# Patient Record
Sex: MALE | Race: WHITE
[De-identification: names, ages, dates, MRNs, and addresses within clinical notes are randomized per-mention and may not be internally consistent; named-entity substitution may affect disease eponyms.]

---

## 2018-03-09 ENCOUNTER — HOSPITAL ENCOUNTER (EMERGENCY)
Dept: HOSPITAL 43 - DL.ED | Age: 48
Discharge: HOME | End: 2018-03-09
Payer: COMMERCIAL
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
## 2018-03-09 DIAGNOSIS — S22.049A: Primary | ICD-10-CM

## 2018-03-09 PROCEDURE — 72125 CT NECK SPINE W/O DYE: CPT

## 2018-03-09 PROCEDURE — 71250 CT THORAX DX C-: CPT

## 2018-03-09 PROCEDURE — 72131 CT LUMBAR SPINE W/O DYE: CPT

## 2018-03-09 PROCEDURE — 72128 CT CHEST SPINE W/O DYE: CPT

## 2018-03-09 PROCEDURE — 99284 EMERGENCY DEPT VISIT MOD MDM: CPT

## 2018-03-09 NOTE — EDM.PDOC
Scribed by Anni Clemente 03/09/18 9425 for Mann Hoffman MD





<Mann Hoffman - Last Filed: 03/09/18 18:53>





ED HPI GENERAL MEDICAL PROBLEM





- General


Chief Complaint: Back Pain or Injury


Stated Complaint: FELL ON ICE 3/9 [phone]


Time Seen by Provider: 03/09/18 18:15


Source of Information: Reports: Patient, RN, RN Notes Reviewed


History Limitations: Reports: No Limitations


  ** Upper Back


Pain Score (Numeric/FACES): 10





- Related Data


 Allergies











Allergy/AdvReac Type Severity Reaction Status Date / Time


 


No Known Allergies Allergy   Verified 03/09/18 18:08











Home Meds: 


 Home Meds





. [No Known Home Meds]  03/09/18 [History]











Past Medical History





- Past Health History


Medical/Surgical History: Denies Medical/Surgical History





Social & Family History





- Tobacco Use


Smoking Status *Q: Never Smoker





- Recreational Drug Use


Recreational Drug Use: No





Course





- Vital Signs


Last Recorded V/S: 


 Last Vital Signs











Temp  98.4 F   03/09/18 18:09


 


Pulse  70   03/09/18 18:09


 


Resp  16   03/09/18 18:09


 


BP  123/98 H  03/09/18 18:09


 


Pulse Ox  98   03/09/18 18:09














- Orders/Labs/Meds


Meds: 


Medications














Discontinued Medications














Generic Name Dose Route Start Last Admin





  Trade Name July  PRN Reason Stop Dose Admin


 


Hydrocodone Bitart/Acetaminophen  1 tab  03/09/18 18:26  03/09/18 18:41





  Norco 325-10 Mg  PO  03/09/18 18:27  1 tab





  ONETIME ONE   Administration


 


Cyclobenzaprine HCl  10 mg  03/09/18 20:15  03/09/18 20:24





  Flexeril  PO  03/09/18 20:16  10 mg





  ONETIME ONE   Administration


 


Cyclobenzaprine HCl  Confirm  03/09/18 21:38  03/09/18 22:12





  Flexeril  Administered  03/09/18 21:39  Not Given





  Dose   





  20 mg   





  .ROUTE   





  .STK-MED ONE   


 


Ketorolac Tromethamine  60 mg  03/09/18 18:26  03/09/18 18:41





  Toradol  IM  03/09/18 18:27  60 mg





  ONETIME ONE   Administration


 


Oxycodone/Acetaminophen  1 tab  03/09/18 20:15  03/09/18 20:24





  Percocet 325-5 Mg  PO  03/09/18 20:16  1 tab





  ONETIME ONE   Administration


 


Oxycodone/Acetaminophen  Confirm  03/09/18 21:38  03/09/18 22:12





  Percocet 325-5 Mg  Administered  03/09/18 21:39  Not Given





  Dose   





  3 tab   





  .ROUTE   





  .STK-MED ONE   














Departure





- Departure


Disposition: Home, Self-Care 01


Clinical Impression: 


 Compression fracture of body of thoracic vertebra, Fall (on) (from) other 

stairs and steps, initial encounter








- Discharge Information


Instructions:  Vertebral Fracture


Referrals: 


PCP,None [Primary Care Provider] - 


Forms:  ED Department Discharge


Additional Instructions: 


no lifting greater than 15#


rest, light activity


avoid any additional trauma


follow up in clinic 2 weeks, sooner if increase in pain, numbess or change in 

symptoms


Percocet 5/325 one every 6 hours as needed for Pain #20


Flexeril 10mg one-j half to one every 8 hours as needed for muscle spasm #15





<Mayda Elaine - Last Filed: 03/10/18 06:29>





ED HPI GENERAL MEDICAL PROBLEM





- History of Present Illness


INITIAL COMMENTS - FREE TEXT/NARRATIVE: 





ED with complaint of back and chest pain, Notes falling on stairs around 8 am 

today while carrying case of water. Landed on back with water landing on chest, 

mid neck and back pain. Chehest pain with taking big breaths and movement.





ED ROS GENERAL





- Review of Systems


Review Of Systems: See Below


Constitutional: Reports: No Symptoms


HEENT: Reports: No Symptoms


Respiratory: Reports: No Symptoms


Cardiovascular: Reports: No Symptoms


GI/Abdominal: Reports: No Symptoms


Musculoskeletal: Reports: Neck Pain, Back Pain


Skin: Reports: No Symptoms


Neurological: Reports: No Symptoms





ED EXAM, UPPER BACK/NECK PAIN





- Physical Exam


Exam: See Below


Exam Limited By: No Limitations


General Appearance: Alert, Mild Distress


Eye Exam: Bilateral Eye: EOMI, PERRL


Ears Exam: Normal External Exam


Nose Exam: Normal Inspection


Throat/Mouth Exam: Normal Inspection, Normal Voice


Head Exam: Atraumatic, Normocephalic


Neck Exam: Abnormal Alignment, Paraspinous Muscle Tender, Spinous Processes 

Tender, Tender Lateral, Tender Midline


Nexus Criteria: Posterior, Midline Cervical Tenderness, Painful Distraction 

Injuries.  No: Evidence of Intoxication, Altered Level of Consciousness, Focal 

Neurological Deficit


Cardiovascular/Respiratory: Regular Rate, Rhythm, Normal Peripheral Pulses, 

Normal Breath Sounds


GI/Abdominal: Normal Bowel Sounds


Extremities: Normal Range of Motion


Neurologic: CNs II-XII nml As Tested, No Motor/Sensory Deficits, Alert, Normal 

Mood/Affect, Oriented x 3.  No: Motor Weakness


Psychiatric: Normal Affect


Skin Exam: Normal Color, Warm/Dry





Course





- Radiology Interpretation


Free Text/Narrative:: 





Cervical CT negative


Thoracic: T4 compression fracture with 10% loss in vertebral body height, 


Lumbar: negative


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 No sign of rib 

fracture. non specific enlarged 2.5cm right paresophageal lymph node. 





- Re-Assessments/Exams


Free Text/Narrative Re-Assessment/Exam: 





03/10/18 06:27


Discussed findings with patient including need of follow up with PCP regarding 

incidental finding of enlarge paraesophageal node.


TC consult Dr. Ruffin regarding T4 fracture. Follow up recommended in 2 weeks. 

No lifting greater than 15 #. 





Departure





- Departure


Time of Disposition: 22:10


Condition: Fair





I have read and agree with the documentation that has been completed regarding 

this visit. By signing this record, I attest that the documentation was 

completed in my physical presence and is an accurate record of the encounter.

## 2021-08-19 NOTE — CR
PROCEDURE INFORMATION: 

Exam: XR Lumbosacral Spine 

Exam date and time: 8/19/2021 10:32 PM 

Age: 51 years old 

Clinical indication: Low back pain 



TECHNIQUE: 

Imaging protocol: XR of the lumbosacral spine. 

Views: 2 or 3 views. 



COMPARISON: 

CT Lumbar Spine wo Cont 3/9/2018 8:20 PM 



FINDINGS: 

Bones/joints: Moderate loss of disc height seen at the L5-S1 level compatible 

with degenerative disc disease. 

Soft tissues: Unremarkable. 



IMPRESSION: 

1. There are no acute osseous findings. 

2. Moderate loss of disc height at L5-S1 compatible with degenerative disc 

disease.

## 2021-08-19 NOTE — EDM.PDOC
ED HPI GENERAL MEDICAL PROBLEM





- General


Chief Complaint: Back Pain or Injury


Stated Complaint: INJURED BACK,


Time Seen by Provider: 08/19/21 22:03


Source of Information: Reports: Patient


History Limitations: Reports: No Limitations





- History of Present Illness


INITIAL COMMENTS - FREE TEXT/NARRATIVE: 





Patient is a unfortunate 51-year-old  male who presents emergency dep

artment today with complaint of low back pain.  The patient reports that he was 

working out and lifting weights 5 days ago and felt a pull in his low back.  The

patient reports that he has had pain ever since however, he was working today 

and felt increasing low pain.  This concerned the patient so he sought care in 

the emergency department today for further evaluation.  The patient reports that

for the past 12 years he has had issues with his back and has had an MRI as 

recently as 2 weeks ago which showed effacement and degenerative disc disease in

L4-L5 and S1.  The patient denies any saddle anesthesia and has had no loss of 

bowel or bladder function no fever no chills.  Patient reports the pain is worse

with range of motion or palpation it does improve with rest but does not 

entirely alleviate


  ** Lower Back


Pain Score (Numeric/FACES): 10





- Related Data


                                    Allergies











Allergy/AdvReac Type Severity Reaction Status Date / Time


 


No Known Allergies Allergy   Verified 03/09/18 18:08











Home Meds: 


                                    Home Meds





Cyclobenzaprine [Flexeril] 10 mg PO TID PRN #15 tab 08/19/21 [Rx]


Ibuprofen 800 mg PO TID #15 tablet 08/19/21 [Rx]


Lidocaine [Blue-Emu Lidocaine Patch] 1 each TP DAILY PRN #10 adh..patch 08/19/21

[Rx]











Past Medical History





- Past Health History


Medical/Surgical History: Denies Medical/Surgical History





ED ROS GENERAL





- Review of Systems


Review Of Systems: See Below


Constitutional: Denies: Fever, Chills


Musculoskeletal: Reports: Back Pain





ED EXAM,LOWER BACK PAIN/INJURY





- Physical Exam


Exam: See Below


Exam Limited By: No Limitations


General Appearance: Alert, WD/WN, Mild Distress


Neck: Normal Inspection, Supple, Non-Tender, Full Range of Motion


Respiratory/Chest: No Respiratory Distress, Lungs Clear, Normal Breath Sounds, 

No Accessory Muscle Use, Chest Non-Tender


Cardiovascular: Normal Peripheral Pulses, Regular Rate, Rhythm, No Edema, No 

Gallop, No JVD, No Murmur, No Rub


GI/Abdominal: Normal Bowel Sounds, Soft, Non-Tender, No Organomegaly, No 

Distention, No Abnormal Bruit, No Mass


Back Exam: Normal Inspection, Other (Bilateral paraspinous muscle tenderness at 

the L4-L5 level that radiates to the bilateral SI joints and bilateral buttocks,

 negative spasm, normal DTRs, positive leg lift bilaterally 30 degrees)


Extremities: Normal Inspection


Neurological: Alert, Normal Mood/Affect, Normal Dorsiflexion, CN II-XII Intact, 

Normal Plantar Flexion, Normal Gait, Normal Reflexes, No Motor/Sensory Deficits,

 Oriented x 3


DTR - Lower Extremities: 4+: Knee (R), Knee (L), Ankle (R), Ankle (L)


Skin Exam: Warm, Dry, Intact





Course





- Vital Signs


Text/Narrative:: 





Work-up today is reassuring, patient has had some improvement in pain but no 

resolution of pain, the patient will be discharged home and encouraged to 

follow-up outpatient with PCP or to return to the emergency department for any 

worsening condition


Last Recorded V/S: 


                                Last Vital Signs











Temp  98.3 F   08/19/21 21:59


 


Pulse  79   08/19/21 21:59


 


Resp  20   08/19/21 21:59


 


BP  143/79 H  08/19/21 21:59


 


Pulse Ox  97   08/19/21 21:59














- Orders/Labs/Meds


Orders: 


                               Active Orders 24 hr











 Category Date Time Status


 


 Sodium Chloride 0.9% [Saline Flush] Med  08/19/21 22:01 Active





 10 ml FLUSH ASDIRECTED PRN   


 


 Saline Lock Insert [OM.PC] Stat Oth  08/19/21 22:01 Ordered








                                Medication Orders





Sodium Chloride (Sodium Chloride 0.9% 10 Ml Syringe)  10 ml FLUSH ASDIRECTED PRN


   PRN Reason: Keep Vein Open








Meds: 


Medications











Generic Name Dose Route Start Last Admin





  Trade Name Freq  PRN Reason Stop Dose Admin


 


Sodium Chloride  10 ml  08/19/21 22:01 





  Sodium Chloride 0.9% 10 Ml Syringe  FLUSH  





  ASDIRECTED PRN  





  Keep Vein Open  














Discontinued Medications














Generic Name Dose Route Start Last Admin





  Trade Name Freq  PRN Reason Stop Dose Admin


 


Haloperidol Lactate  5 mg  08/19/21 22:01  08/19/21 22:16





  Haloperidol Lactate 5 Mg/Ml Sdv  IVPUSH  08/19/21 22:02  5 mg





  ONETIME ONE   Administration














Departure





- Departure


Time of Disposition: 23:09


Disposition: DC[facility]


Condition: Good


Clinical Impression: 


 Lumbar radiculopathy





Lumbar strain


Qualifiers:


 Encounter type: initial encounter Qualified Code(s): S39.012A - Strain of 

muscle, fascia and tendon of lower back, initial encounter








- Discharge Information


*PRESCRIPTION DRUG MONITORING PROGRAM REVIEWED*: No


*COPY OF PRESCRIPTION DRUG MONITORING REPORT IN PATIENT SANDY: No


Prescriptions: 


Lidocaine [Blue-Emu Lidocaine Patch] 1 each TP DAILY PRN #10 adh..patch


 PRN Reason: Pain


Cyclobenzaprine [Flexeril] 10 mg PO TID PRN #15 tab


 PRN Reason: Pain


Ibuprofen 800 mg PO TID #15 tablet


Instructions:  Muscle Strain, Easy-to-Read, Lumbosacral Strain


Referrals: 


PCP,Not In Area [Ordering Only Provider] - 


Forms:  ED Department Discharge


Additional Instructions: 


Home, rest, heating pad 20 minutes at a time 3-4 times daily, avoid bending at 

the waist, no lifting greater than 10 pounds for 1 week, return to the emergency

department for any worsening condition





Sepsis Event Note (ED)





- Focused Exam


Vital Signs: 


                                   Vital Signs











  Temp Pulse Resp BP Pulse Ox


 


 08/19/21 21:59  98.3 F  79  20  143/79 H  97














- My Orders


Last 24 Hours: 


My Active Orders





08/19/21 22:01


Sodium Chloride 0.9% [Saline Flush]   10 ml FLUSH ASDIRECTED PRN 


Saline Lock Insert [OM.PC] Stat 














- Assessment/Plan


Last 24 Hours: 


My Active Orders





08/19/21 22:01


Sodium Chloride 0.9% [Saline Flush]   10 ml FLUSH ASDIRECTED PRN 


Saline Lock Insert [OM.PC] Stat